# Patient Record
Sex: FEMALE | Race: ASIAN | NOT HISPANIC OR LATINO
[De-identification: names, ages, dates, MRNs, and addresses within clinical notes are randomized per-mention and may not be internally consistent; named-entity substitution may affect disease eponyms.]

---

## 2018-06-21 ENCOUNTER — APPOINTMENT (OUTPATIENT)
Dept: SURGERY | Facility: CLINIC | Age: 48
End: 2018-06-21
Payer: MEDICAID

## 2018-06-21 ENCOUNTER — APPOINTMENT (OUTPATIENT)
Dept: SURGERY | Facility: CLINIC | Age: 48
End: 2018-06-21

## 2018-06-21 VITALS
BODY MASS INDEX: 23.3 KG/M2 | DIASTOLIC BLOOD PRESSURE: 80 MMHG | HEART RATE: 71 BPM | SYSTOLIC BLOOD PRESSURE: 118 MMHG | WEIGHT: 145 LBS | TEMPERATURE: 98.2 F | HEIGHT: 66 IN

## 2018-06-21 DIAGNOSIS — Z78.9 OTHER SPECIFIED HEALTH STATUS: ICD-10-CM

## 2018-06-21 DIAGNOSIS — Z80.3 FAMILY HISTORY OF MALIGNANT NEOPLASM OF BREAST: ICD-10-CM

## 2018-06-21 DIAGNOSIS — R92.8 OTHER ABNORMAL AND INCONCLUSIVE FINDINGS ON DIAGNOSTIC IMAGING OF BREAST: ICD-10-CM

## 2018-06-21 PROCEDURE — 99203 OFFICE O/P NEW LOW 30 MIN: CPT

## 2019-04-07 ENCOUNTER — EMERGENCY (EMERGENCY)
Facility: HOSPITAL | Age: 49
LOS: 1 days | Discharge: HOME | End: 2019-04-07
Attending: EMERGENCY MEDICINE | Admitting: EMERGENCY MEDICINE
Payer: MEDICAID

## 2019-04-07 VITALS
RESPIRATION RATE: 15 BRPM | DIASTOLIC BLOOD PRESSURE: 87 MMHG | OXYGEN SATURATION: 99 % | SYSTOLIC BLOOD PRESSURE: 130 MMHG | TEMPERATURE: 98 F | HEART RATE: 91 BPM

## 2019-04-07 PROCEDURE — 73030 X-RAY EXAM OF SHOULDER: CPT | Mod: 26,RT

## 2019-04-07 PROCEDURE — 73070 X-RAY EXAM OF ELBOW: CPT | Mod: 26,RT

## 2019-04-07 PROCEDURE — 73060 X-RAY EXAM OF HUMERUS: CPT | Mod: 26,RT

## 2019-04-07 PROCEDURE — 99283 EMERGENCY DEPT VISIT LOW MDM: CPT | Mod: 25

## 2019-04-07 PROCEDURE — 73130 X-RAY EXAM OF HAND: CPT | Mod: 26,RT

## 2019-04-07 RX ORDER — ACETAMINOPHEN 500 MG
975 TABLET ORAL ONCE
Qty: 0 | Refills: 0 | Status: COMPLETED | OUTPATIENT
Start: 2019-04-07 | End: 2019-04-07

## 2019-04-07 RX ADMIN — Medication 975 MILLIGRAM(S): at 04:26

## 2019-04-07 NOTE — ED PROVIDER NOTE - CLINICAL SUMMARY MEDICAL DECISION MAKING FREE TEXT BOX
R arm pain s/p mechanical fall. Neurovascularly intact. No obvious deformity. Will assess for fracture/dislocation with x-rays. Tylenol and reassess.

## 2019-04-07 NOTE — ED PROVIDER NOTE - ATTENDING CONTRIBUTION TO CARE
MD Gallego:  I performed a face to face bedside interview with patient regarding history of present illness, review of symptoms and past medical history. I completed an independent physical exam(documented below).  I have discussed patient's plan of care with resident.   I agree with note as stated above, having amended the EMR as needed to reflect my findings. I have discussed the assessment and plan of care.  This includes during the time I functioned as the attending physician for this patient.  PE:  Gen: Alert, mild distress  Head: NC, AT,  EOMI, normal lids/conjunctiva  ENT:  normal hearing, patent oropharynx without erythema/exudate  Neck: +supple, no tenderness/meningismus/JVD, +Trachea midline  Chest: no chest wall tenderness, equal chest rise  Pulm: Bilateral BS, normal resp effort, no wheeze/stridor/retractions  CV: RRR, no M/R/G, +dist pulses  Abd: +BS, soft, NT/ND  Rectal: deferred  Mskel: ttp throughout most of RUE (greatest over R elbow and dorsal R hand), +edema of R hand  Skin: no rash  Neuro: AAOx3  MDM:  47yo F, denies pmh, c/o R arm pain s/p mechanical fall (tripped on dress). No head trauma, no loc, not on AC. Imaging, arm sling for comfort if no fractures, pain meds, likely dc.

## 2019-04-07 NOTE — ED PROVIDER NOTE - NSFOLLOWUPINSTRUCTIONS_ED_ALL_ED_FT
Follow up with your PCP in 24-48 hours.   May take Tylenol and Motrin as directed on the bottle for pain control.   Return to the ER if you develop any new or worsening symptoms such as chest pain, SOB, numbness, weakness, nausea, vomiting, or visual changes.

## 2019-04-07 NOTE — ED PROVIDER NOTE - OBJECTIVE STATEMENT
48F no PMHx p/w R arm pain after a mechanical fall. Accidentally tripped on her dress and fell onto her R arm just prior to coming to the ED. Denies head trauma, LOC, or blood thinner use. Able to ambulate without pain. Reporting pain to entire R arm below the shoulder. Associated with mild numbness over the lateral deltoid. Denies any other symptoms including HA, visual changes, chest pain, SOB, abdominal pain, N/V/D, weakness.

## 2019-04-07 NOTE — ED ADULT TRIAGE NOTE - CHIEF COMPLAINT QUOTE
pt s/p mechanical fall today 6pm and now c/o right sided body pain and inability to raise or move right arm. pt ambulatory in triage. no obvious deformities noted. denies loc or blood thinner use

## 2019-04-07 NOTE — ED ADULT NURSE NOTE - NSIMPLEMENTINTERV_GEN_ALL_ED
Implemented All Fall Risk Interventions:  [place] to call system. Call bell, personal items and telephone within reach. Instruct patient to call for assistance. Room bathroom lighting operational. Non-slip footwear when patient is off stretcher. Physically safe environment: no spills, clutter or unnecessary equipment. Stretcher in lowest position, wheels locked, appropriate side rails in place. Provide visual cue, wrist band, yellow gown, etc. Monitor gait and stability. Monitor for mental status changes and reorient to person, place, and time. Review medications for side effects contributing to fall risk. Reinforce activity limits and safety measures with patient and family.

## 2019-04-07 NOTE — ED ADULT NURSE NOTE - OBJECTIVE STATEMENT
Pt arrives to ED s/p trip and fall down 8 steps landing on her right arm and buttocks.  Pt denies LOC or striking her head.  UCG completed and documented.  Pt assessed by MD.  Pt awaiting XR.  Pt reports pain to rt shoulder, arm and hand.

## 2019-04-07 NOTE — ED PROVIDER NOTE - NS ED ROS FT
GENERAL: No fever or chills  EYES: no change in vision  HEENT: no trouble swallowing or speaking  CARDIAC: no chest pain  PULMONARY: no cough or SOB  GI: no abdominal pain, no nausea, no vomiting, no diarrhea or constipation  : No changes in urination  SKIN: no rashes/bruising/laceration   NEURO: numbness over R deltoid. no headache or weakness  MSK: R shoulder pan, R elbow pain, R hand pain    ~Rambo Spann PGY1

## 2020-01-04 ENCOUNTER — NON-APPOINTMENT (OUTPATIENT)
Age: 50
End: 2020-01-04

## 2020-01-04 ENCOUNTER — APPOINTMENT (OUTPATIENT)
Dept: OPHTHALMOLOGY | Facility: CLINIC | Age: 50
End: 2020-01-04
Payer: MEDICAID

## 2020-01-04 PROCEDURE — 92004 COMPRE OPH EXAM NEW PT 1/>: CPT

## 2020-01-04 PROCEDURE — 92015 DETERMINE REFRACTIVE STATE: CPT

## 2022-11-06 ENCOUNTER — EMERGENCY (EMERGENCY)
Facility: HOSPITAL | Age: 52
LOS: 1 days | Discharge: HOME | End: 2022-11-06
Attending: STUDENT IN AN ORGANIZED HEALTH CARE EDUCATION/TRAINING PROGRAM | Admitting: STUDENT IN AN ORGANIZED HEALTH CARE EDUCATION/TRAINING PROGRAM

## 2022-11-06 VITALS
SYSTOLIC BLOOD PRESSURE: 127 MMHG | OXYGEN SATURATION: 99 % | TEMPERATURE: 98 F | RESPIRATION RATE: 18 BRPM | DIASTOLIC BLOOD PRESSURE: 84 MMHG | HEART RATE: 72 BPM

## 2022-11-06 VITALS
SYSTOLIC BLOOD PRESSURE: 118 MMHG | TEMPERATURE: 98 F | DIASTOLIC BLOOD PRESSURE: 79 MMHG | HEART RATE: 75 BPM | RESPIRATION RATE: 16 BRPM | OXYGEN SATURATION: 100 %

## 2022-11-06 LAB
ALBUMIN SERPL ELPH-MCNC: 4.2 G/DL — SIGNIFICANT CHANGE UP (ref 3.3–5)
ALP SERPL-CCNC: 73 U/L — SIGNIFICANT CHANGE UP (ref 40–120)
ALT FLD-CCNC: 22 U/L — SIGNIFICANT CHANGE UP (ref 4–33)
ANION GAP SERPL CALC-SCNC: 8 MMOL/L — SIGNIFICANT CHANGE UP (ref 7–14)
APPEARANCE UR: ABNORMAL
AST SERPL-CCNC: 26 U/L — SIGNIFICANT CHANGE UP (ref 4–32)
BASE EXCESS BLDV CALC-SCNC: 0.4 MMOL/L — SIGNIFICANT CHANGE UP (ref -2–3)
BASOPHILS # BLD AUTO: 0.05 K/UL — SIGNIFICANT CHANGE UP (ref 0–0.2)
BASOPHILS NFR BLD AUTO: 1 % — SIGNIFICANT CHANGE UP (ref 0–2)
BILIRUB SERPL-MCNC: 0.6 MG/DL — SIGNIFICANT CHANGE UP (ref 0.2–1.2)
BILIRUB UR-MCNC: NEGATIVE — SIGNIFICANT CHANGE UP
BUN SERPL-MCNC: 10 MG/DL — SIGNIFICANT CHANGE UP (ref 7–23)
CA-I SERPL-SCNC: 1.27 MMOL/L — SIGNIFICANT CHANGE UP (ref 1.15–1.33)
CALCIUM SERPL-MCNC: 9.5 MG/DL — SIGNIFICANT CHANGE UP (ref 8.4–10.5)
CHLORIDE BLDV-SCNC: 104 MMOL/L — SIGNIFICANT CHANGE UP (ref 96–108)
CHLORIDE SERPL-SCNC: 106 MMOL/L — SIGNIFICANT CHANGE UP (ref 98–107)
CO2 BLDV-SCNC: 28 MMOL/L — HIGH (ref 22–26)
CO2 SERPL-SCNC: 26 MMOL/L — SIGNIFICANT CHANGE UP (ref 22–31)
COLOR SPEC: YELLOW — SIGNIFICANT CHANGE UP
CREAT SERPL-MCNC: 0.61 MG/DL — SIGNIFICANT CHANGE UP (ref 0.5–1.3)
DIFF PNL FLD: NEGATIVE — SIGNIFICANT CHANGE UP
EGFR: 110 ML/MIN/1.73M2 — SIGNIFICANT CHANGE UP
EOSINOPHIL # BLD AUTO: 0.05 K/UL — SIGNIFICANT CHANGE UP (ref 0–0.5)
EOSINOPHIL NFR BLD AUTO: 1 % — SIGNIFICANT CHANGE UP (ref 0–6)
FLUAV AG NPH QL: SIGNIFICANT CHANGE UP
FLUBV AG NPH QL: SIGNIFICANT CHANGE UP
GAS PNL BLDV: 137 MMOL/L — SIGNIFICANT CHANGE UP (ref 136–145)
GAS PNL BLDV: SIGNIFICANT CHANGE UP
GAS PNL BLDV: SIGNIFICANT CHANGE UP
GLUCOSE BLDV-MCNC: 84 MG/DL — SIGNIFICANT CHANGE UP (ref 70–99)
GLUCOSE SERPL-MCNC: 88 MG/DL — SIGNIFICANT CHANGE UP (ref 70–99)
GLUCOSE UR QL: NEGATIVE — SIGNIFICANT CHANGE UP
HCG UR QL: NEGATIVE — SIGNIFICANT CHANGE UP
HCO3 BLDV-SCNC: 26 MMOL/L — SIGNIFICANT CHANGE UP (ref 22–29)
HCT VFR BLD CALC: 36 % — SIGNIFICANT CHANGE UP (ref 34.5–45)
HCT VFR BLDA CALC: 35 % — SIGNIFICANT CHANGE UP (ref 34.5–46.5)
HGB BLD CALC-MCNC: 11.7 G/DL — SIGNIFICANT CHANGE UP (ref 11.5–15.5)
HGB BLD-MCNC: 11.3 G/DL — LOW (ref 11.5–15.5)
IANC: 2.7 K/UL — SIGNIFICANT CHANGE UP (ref 1.8–7.4)
IMM GRANULOCYTES NFR BLD AUTO: 0.2 % — SIGNIFICANT CHANGE UP (ref 0–0.9)
KETONES UR-MCNC: ABNORMAL
LACTATE BLDV-MCNC: 1.1 MMOL/L — SIGNIFICANT CHANGE UP (ref 0.5–2)
LEUKOCYTE ESTERASE UR-ACNC: NEGATIVE — SIGNIFICANT CHANGE UP
LIDOCAIN IGE QN: 50 U/L — SIGNIFICANT CHANGE UP (ref 7–60)
LYMPHOCYTES # BLD AUTO: 1.7 K/UL — SIGNIFICANT CHANGE UP (ref 1–3.3)
LYMPHOCYTES # BLD AUTO: 34.1 % — SIGNIFICANT CHANGE UP (ref 13–44)
MCHC RBC-ENTMCNC: 27.1 PG — SIGNIFICANT CHANGE UP (ref 27–34)
MCHC RBC-ENTMCNC: 31.4 GM/DL — LOW (ref 32–36)
MCV RBC AUTO: 86.3 FL — SIGNIFICANT CHANGE UP (ref 80–100)
MONOCYTES # BLD AUTO: 0.48 K/UL — SIGNIFICANT CHANGE UP (ref 0–0.9)
MONOCYTES NFR BLD AUTO: 9.6 % — SIGNIFICANT CHANGE UP (ref 2–14)
NEUTROPHILS # BLD AUTO: 2.7 K/UL — SIGNIFICANT CHANGE UP (ref 1.8–7.4)
NEUTROPHILS NFR BLD AUTO: 54.1 % — SIGNIFICANT CHANGE UP (ref 43–77)
NITRITE UR-MCNC: NEGATIVE — SIGNIFICANT CHANGE UP
NRBC # BLD: 0 /100 WBCS — SIGNIFICANT CHANGE UP (ref 0–0)
NRBC # FLD: 0 K/UL — SIGNIFICANT CHANGE UP (ref 0–0)
PCO2 BLDV: 48 MMHG — HIGH (ref 39–42)
PH BLDV: 7.35 — SIGNIFICANT CHANGE UP (ref 7.32–7.43)
PH UR: 6.5 — SIGNIFICANT CHANGE UP (ref 5–8)
PLATELET # BLD AUTO: 364 K/UL — SIGNIFICANT CHANGE UP (ref 150–400)
PO2 BLDV: 22 MMHG — SIGNIFICANT CHANGE UP
POTASSIUM BLDV-SCNC: 3.7 MMOL/L — SIGNIFICANT CHANGE UP (ref 3.5–5.1)
POTASSIUM SERPL-MCNC: 4 MMOL/L — SIGNIFICANT CHANGE UP (ref 3.5–5.3)
POTASSIUM SERPL-SCNC: 4 MMOL/L — SIGNIFICANT CHANGE UP (ref 3.5–5.3)
PROT SERPL-MCNC: 7.1 G/DL — SIGNIFICANT CHANGE UP (ref 6–8.3)
PROT UR-MCNC: ABNORMAL
RBC # BLD: 4.17 M/UL — SIGNIFICANT CHANGE UP (ref 3.8–5.2)
RBC # FLD: 13.6 % — SIGNIFICANT CHANGE UP (ref 10.3–14.5)
RBC CASTS # UR COMP ASSIST: SIGNIFICANT CHANGE UP /HPF (ref 0–4)
RSV RNA NPH QL NAA+NON-PROBE: SIGNIFICANT CHANGE UP
SAO2 % BLDV: 35.3 % — SIGNIFICANT CHANGE UP
SARS-COV-2 RNA SPEC QL NAA+PROBE: SIGNIFICANT CHANGE UP
SODIUM SERPL-SCNC: 140 MMOL/L — SIGNIFICANT CHANGE UP (ref 135–145)
SP GR SPEC: 1.02 — SIGNIFICANT CHANGE UP (ref 1.01–1.05)
UROBILINOGEN FLD QL: SIGNIFICANT CHANGE UP
WBC # BLD: 4.99 K/UL — SIGNIFICANT CHANGE UP (ref 3.8–10.5)
WBC # FLD AUTO: 4.99 K/UL — SIGNIFICANT CHANGE UP (ref 3.8–10.5)
WBC UR QL: SIGNIFICANT CHANGE UP /HPF (ref 0–5)

## 2022-11-06 PROCEDURE — 76830 TRANSVAGINAL US NON-OB: CPT | Mod: 26

## 2022-11-06 PROCEDURE — 73502 X-RAY EXAM HIP UNI 2-3 VIEWS: CPT | Mod: 26,LT

## 2022-11-06 PROCEDURE — 74177 CT ABD & PELVIS W/CONTRAST: CPT | Mod: 26,MA

## 2022-11-06 PROCEDURE — 76770 US EXAM ABDO BACK WALL COMP: CPT | Mod: 26

## 2022-11-06 PROCEDURE — 99285 EMERGENCY DEPT VISIT HI MDM: CPT

## 2022-11-06 RX ORDER — ACETAMINOPHEN 500 MG
975 TABLET ORAL ONCE
Refills: 0 | Status: COMPLETED | OUTPATIENT
Start: 2022-11-06 | End: 2022-11-06

## 2022-11-06 RX ORDER — SODIUM CHLORIDE 9 MG/ML
1000 INJECTION INTRAMUSCULAR; INTRAVENOUS; SUBCUTANEOUS ONCE
Refills: 0 | Status: COMPLETED | OUTPATIENT
Start: 2022-11-06 | End: 2022-11-06

## 2022-11-06 RX ORDER — IBUPROFEN 200 MG
400 TABLET ORAL ONCE
Refills: 0 | Status: COMPLETED | OUTPATIENT
Start: 2022-11-06 | End: 2023-10-05

## 2022-11-06 RX ORDER — CEFTRIAXONE 500 MG/1
500 INJECTION, POWDER, FOR SOLUTION INTRAMUSCULAR; INTRAVENOUS ONCE
Refills: 0 | Status: COMPLETED | OUTPATIENT
Start: 2022-11-06 | End: 2022-11-06

## 2022-11-06 RX ORDER — METRONIDAZOLE 500 MG
500 TABLET ORAL ONCE
Refills: 0 | Status: COMPLETED | OUTPATIENT
Start: 2022-11-06 | End: 2022-11-06

## 2022-11-06 RX ORDER — MORPHINE SULFATE 50 MG/1
4 CAPSULE, EXTENDED RELEASE ORAL ONCE
Refills: 0 | Status: DISCONTINUED | OUTPATIENT
Start: 2022-11-06 | End: 2022-11-06

## 2022-11-06 RX ORDER — LIDOCAINE 4 G/100G
1 CREAM TOPICAL
Qty: 5 | Refills: 0
Start: 2022-11-06 | End: 2022-11-10

## 2022-11-06 RX ORDER — METRONIDAZOLE 500 MG
1 TABLET ORAL
Qty: 28 | Refills: 0
Start: 2022-11-06 | End: 2022-11-19

## 2022-11-06 RX ORDER — ACETAMINOPHEN 500 MG
650 TABLET ORAL ONCE
Refills: 0 | Status: DISCONTINUED | OUTPATIENT
Start: 2022-11-06 | End: 2022-11-06

## 2022-11-06 RX ORDER — IBUPROFEN 200 MG
400 TABLET ORAL ONCE
Refills: 0 | Status: COMPLETED | OUTPATIENT
Start: 2022-11-06 | End: 2022-11-06

## 2022-11-06 RX ADMIN — Medication 400 MILLIGRAM(S): at 15:38

## 2022-11-06 RX ADMIN — Medication 500 MILLIGRAM(S): at 18:59

## 2022-11-06 RX ADMIN — Medication 975 MILLIGRAM(S): at 11:55

## 2022-11-06 RX ADMIN — Medication 975 MILLIGRAM(S): at 15:44

## 2022-11-06 RX ADMIN — CEFTRIAXONE 500 MILLIGRAM(S): 500 INJECTION, POWDER, FOR SOLUTION INTRAMUSCULAR; INTRAVENOUS at 18:59

## 2022-11-06 RX ADMIN — Medication 100 MILLIGRAM(S): at 18:58

## 2022-11-06 RX ADMIN — SODIUM CHLORIDE 1000 MILLILITER(S): 9 INJECTION INTRAMUSCULAR; INTRAVENOUS; SUBCUTANEOUS at 14:52

## 2022-11-06 NOTE — CONSULT NOTE ADULT - ASSESSMENT
51y  LMP  w/ 1 month of back pain which radiates to suprapubic area and down legs with positional changes.  GYN consulted for rule out torsion.  Review of imaging demonstrates no sonographic signs of torsion.  Right hydrosalpinx noted.  No signs or symptoms of systemic infection.  Labs and vital signs wnl.  Differential includes PID however could also be an incidental finding.  Does not meet criteria for inpatient treatment.    Recs:  - Ceftriaxone IM x1, followed by Doxycylcine 100mg BID and Metronidazole 500mg BID PO x14d  - Urine GC/C to be sent by ED  - Close follow up with outpatient GYN for surveillance of sonographic findings  - Remainder of workup, treatment and dispo per ED  - Please reconsult with any further questions    Pt seen and examined w/ Dr. Luis Felipe Bates, PGY4 51y  LMP  w/ 1 month of back pain which radiates to suprapubic area and down legs with positional changes.  GYN consulted for rule out torsion.  Review of imaging demonstrates no sonographic signs of torsion.  Right hydrosalpinx noted.  No signs or symptoms of systemic infection.  Physical exam with non specific findings.  Labs and vital signs wnl.  Differential includes PID however could also be an incidental finding.  Does not meet criteria for inpatient treatment.    Recs:  - Ceftriaxone IM x1, followed by Doxycylcine 100mg BID and Metronidazole 500mg BID PO x14d  - Urine GC/C to be sent by ED  - Close follow up with outpatient GYN for surveillance of sonographic findings  - Remainder of workup, treatment and dispo per ED  - Please reconsult with any further questions    Pt seen and examined w/ Dr. Luis Felipe Bates, PGY4

## 2022-11-06 NOTE — ED PROVIDER NOTE - OBJECTIVE STATEMENT
51-year-old female pmhx of recent diagnosis of UTI finishing 7-day course of nitrofurantoin and 1 day comes to ED w/ left-sided low back/flank pain radiating to the left lower quadrant. Their pain/symptom is moderate, constant, non mediating with rest, associated with nausea. Started 1 month prior.  Patient reports missing there last menstrual cycle on 9/26/2022 taking a Plan B due to concerns for pregnancy and subsequently missing their next cycle on 10/26/2020. Reports symptoms of nausea, denies vomiting, diarrhea, falls, trauma, chest pain, shortness of breath, cough, urinary symptoms, stool symptoms. Has only one sexual partner which is her , and reports relationship is monogamous.

## 2022-11-06 NOTE — ED PROVIDER NOTE - ATTENDING CONTRIBUTION TO CARE
I have personally seen and examined this patient.  I have fully participated in the care of this patient. I performed a substantive portion of the visit including all aspects of the medical decision making. I have reviewed all pertinent clinical information, including history, physical exam, plan and the Resident’s note and agree except as noted. - MD Suzanne.    52 yo F, with RLQ to LLQ pain, more tender to RLQ and suprapuvic, dxx broad including appendicitis, diverticulitis, vs. reproductive organ infection including pid and toa, but given no systemic signs of infection, no urgency but needs eval given the level of her discomfort, trans vaginal us, ct, pain meds, and likely gyn consult, according to ct/us findings.

## 2022-11-06 NOTE — CONSULT NOTE ADULT - SUBJECTIVE AND OBJECTIVE BOX
GYN Consult Note    51y  LMP  (Plan B taken in October for unprotected intercourse) presents with 1 month of back pain.  Patient states back pain started 1 month ago around her left flank, and has been constant since.  Pain radiates down her legs and to her suprapubic region at times.  No relieving factors at home.  Intermittently taking tylenol with some relief.  Went to an Urgent Care/PCP last week and was prescribed a 1 week course of nitrofurantoin for suspected UTI as well as gabapentin/meloxicam for symptomatic relief.  She has taken the antibiotics but not yet tried the gabapentin/meloxicam.  Denies nausea, vomiting, changes in urinary symptoms/bowel function, fevers, chills, night sweats.      OB/GYN HISTORY:    x3 (, , ); eTOP D&C x2  States normal pap smears  Name of GYN Physician: Dr. Ita Arceo    PMH: denies  PSH: R rotator cuff surgery, D&C x2  Meds: Macrobid x5d  Allergies: NKDA    REVIEW OF SYSTEMS  General: denies fevers, chills, tiredness  Skin/Breast: denies breast pain  Respiratory and Thorax: denies shortness of breath, denies cough  Cardiovascular: denies chest pain and denies palpitations  Gastrointestinal: denies abdominal pain, nausea/ vomiting	  Genitourinary: denies dysuria, increased urinary frequency, urgency	  Constitutional, Cardiovascular, Respiratory, Gastrointestinal, Genitourinary, Musculoskeletal and Integumentary review of systems are normal except as noted. 	        Vital Signs Last 24 Hrs  T(C): 36.1 (2022 13:43), Max: 36.7 (2022 10:34)  T(F): 97 (2022 13:43), Max: 98 (2022 10:34)  HR: 71 (2022 13:43) (71 - 72)  BP: 124/85 (2022 13:43) (124/85 - 127/84)  BP(mean): --  RR: 16 (2022 13:43) (16 - 18)  SpO2: 100% (2022 13:43) (99% - 100%)    Parameters below as of 2022 13:43  Patient On (Oxygen Delivery Method): room air        PHYSICAL EXAM:   Gen: NAD, alert and oriented x 3  Cardiovascular: regular   Respiratory: breathing comfortably on RA  Abd: soft, non tender in 4 quadrants, suprapubic tenderness to deep palpation, non-distended  Pelvic: closed/long, no CMT, Uterus: normal size, non tender  Adnexa: non tender, no palpable masses  Extremities: NTBL  Skin: warm and well perfused      LABS:                        11.3   4.99  )-----------( 364      ( 2022 13:36 )             36.0     11    140  |  106  |  10  ----------------------------<  88  4.0   |  26  |  0.61    Ca    9.5      2022 13:36    TPro  7.1  /  Alb  4.2  /  TBili  0.6  /  DBili  x   /  AST  26  /  ALT  22  /  AlkPhos  73  11-06      Urinalysis Basic - ( 2022 12:10 )    Color: Yellow / Appearance: Slightly Turbid / S.025 / pH: x  Gluc: x / Ketone: Small  / Bili: Negative / Urobili: <2 mg/dL   Blood: x / Protein: 30 mg/dL / Nitrite: Negative   Leuk Esterase: Negative / RBC: NA /HPF / WBC NA /HPF   Sq Epi: x / Non Sq Epi: x / Bacteria: x    HCG Quantitative, Serum (22 @ 13:36)   HCG Quantitative, Serum: <5.0    RADIOLOGY & ADDITIONAL STUDIES:  < from: US Kidney and Bladder (22 @ 13:01) >    ACC: [id number] EXAM:  US KIDNEYS AND BLADDER                          PROCEDURE DATE:  2022          INTERPRETATION:  CLINICAL INFORMATION: Pelvic and back pain.    COMPARISON: CT abdomen/pelvis 2016.    TECHNIQUE: Sonography of the kidneys and bladder.    FINDINGS:  Right kidney: 9.6 cm. No renal mass or hydronephrosis.    Left kidney: 9.6 cm. No renal mass or hydronephrosis.    Urinary bladder: Within normal limits.    IMPRESSION:  Normal renal ultrasound.        --- End of Report ---      < end of copied text >      < from: US Transvaginal (22 @ 13:01) >    ACC: [id number] EXAM:  US TRANSVAGINAL                          PROCEDURE DATE:  2022          INTERPRETATION:  CLINICAL INFORMATION: Left lower quadrant pain. Request   to evaluate for ovarian torsion.    LMP: 2022.    COMPARISON: CT abdomen/pelvis 2016.    TECHNIQUE:  Endovaginal and transabdominal pelvic sonogram. Color and Spectral   Doppler was performed.    FINDINGS:  Uterus: 10.6 cm x 5.8 cm x 7.6 cm. Enlarged  Endometrium: 12 mm. Within normal limits.    Right ovary: 1.6 cm x 1.1 cm x 2.1 cm. Within normal limits. Normal   arterial waveforms. Right hydrosalpinx.  Left ovary: 2.2 cm x 1.7 cm x 1.7 cm. Within normal limits. Normal   arterial waveforms.    Fluid: None.    IMPRESSION:    No sonographic evidence of ovarian torsion. Right sided hydrosalpinx.        --- End of Report ---    < end of copied text >      < from: CT Abdomen and Pelvis w/ IV Cont (22 @ 14:32) >    ACC: [id number] EXAM:  CT ABDOMEN AND PELVIS IC                          PROCEDURE DATE:  2022          INTERPRETATION:  CLINICAL INFORMATION: Left-sided back/flank pain. Recent   history of treated UTI.    COMPARISON: CT abdomen/pelvis 2016. Abdominal and pelvic ultrasound   2022.    CONTRAST/COMPLICATIONS:  IV Contrast: Omnipaque 350  90 cc administered   10 cc discarded  Oral Contrast: NONE  Complications: None reported at time of study completion    PROCEDURE:  CT of the Abdomen and Pelvis was performed.  Sagittal and coronal reformats were performed.    FINDINGS:  LOWER CHEST: Bibasilar atelectasis.    LIVER: Within normal limits.  BILE DUCTS: Normal caliber.  GALLBLADDER: Within normal limits.  SPLEEN: Within normal limits.  PANCREAS: Within normal limits.  ADRENALS: Within normal limits.  KIDNEYS/URETERS: Within normal limits.    BLADDER: Within normal limits.  REPRODUCTIVE ORGANS: Multiple nodular foci in the myometrium compatible   with fibroids. A 5.6 x 3.2 cm cystic structure in the right adnexa. No   surrounding inflammatory changes.    BOWEL: No bowel obstruction. Appendix is normal.  PERITONEUM: No ascites.  VESSELS: Within normal limits.  RETROPERITONEUM/LYMPH NODES: No lymphadenopathy.  ABDOMINAL WALL: Tiny fat-containing umbilical hernia.  BONES: Degenerative changes.    IMPRESSION:    5.6 cm cystic right adnexal mass. This could represent an ovarian or   paraovarian cyst, hydrosalpinx or other etiology such as peritoneal   inclusion cyst. Note that this appeared to be separate from the right   ovary on same day ultrasound. Recommend contrast-enhanced MRI to better   evaluate.    Fibroid uterus.    --- End of Report ---        < end of copied text >

## 2022-11-06 NOTE — ED ADULT NURSE REASSESSMENT NOTE - ANCILLARY STATUS
radiology results pending
labs sent, sl placed/awaiting lab draw/awaiting radiology
radiology results pending

## 2022-11-06 NOTE — ED PROVIDER NOTE - PHYSICAL EXAMINATION
General: non-toxic, NAD  HEENT: NCAT, PERRL  Cardiac: RRR, no murmurs, 2+ peripheral pulses  Chest: CTAB  Abdomen: mild LLQ/ L CVA ttp, no rebound or guarding. soft, non-distended, bowel sounds present.  MSK: L lower back paraspinal wrapping down superior gluteus to front of abdomen TTP   Extremities: no peripheral edema, calf tenderness, or leg size discrepancies  Skin: no rashes  Neuro: AAOx4, 5+motor, sensory grossly intact  Psych: mood and affect appropriate

## 2022-11-06 NOTE — ED ADULT NURSE REASSESSMENT NOTE - GENERAL PATIENT STATE
comfortable appearance
comfortable appearance
comfortable appearance/resting/sleeping
family/SO at bedside/no change observed

## 2022-11-06 NOTE — ED PROVIDER NOTE - PROGRESS NOTE DETAILS
Patient's prescription sent to their pharmacy indicated during interview. Patient reports improvement on symptoms. Spoke to patient about results. Plan to discharge patient. Patient given OBGYN and PCP follow up and return precautions. Patient agrees with plan. OBGYN gave recs, plan to give antibiotics for prophylactic PID treatment and follow up with OBGYN outpatient. Patient given abx, Patient's prescription sent to their pharmacy indicated during interview. Patient reports improvement on symptoms. Spoke to patient about results. Plan to discharge patient. Patient given OBGYN and PCP follow up and return precautions. Patient agrees with plan.

## 2022-11-06 NOTE — ED ADULT NURSE REASSESSMENT NOTE - SYMPTOMS
back pain radiating down lt leg/abdominal pain back pain radiating down lt leg, abd soft on palpation/abdominal pain

## 2022-11-06 NOTE — ED ADULT TRIAGE NOTE - CHIEF COMPLAINT QUOTE
Pt AOX4 c/o low back pain, is affecting her ADLs as she cannot sit or dress herself easily; has seen her PMD and was Dx'd w/ UTI (she is finishing abx today)   LMP 9/26/22

## 2022-11-06 NOTE — ED PROVIDER NOTE - PATIENT PORTAL LINK FT
You can access the FollowMyHealth Patient Portal offered by  by registering at the following website: [email or web address]. By joining [employer]’s FollowMyHealth portal, you will also be able to view your health information using other applications (apps) compatible with our system.

## 2022-11-06 NOTE — ED PROVIDER NOTE - NS ED ROS FT
Constitutional: no fevers, chills  HEENT: no cough, rhinorrhea  Cardiac: no chest pain, palpitations  Respiratory: no SOB  GI: nausea, no v/d, abd pain, bloody or dark stools  : no dysuria, frequency, or hematuria  MSK: Low left-sided back/left flank pain.  Skin: no rashes  Neuro: no headache, change in vision, weakness  Psych: negative

## 2022-11-06 NOTE — ED ADULT NURSE REASSESSMENT NOTE - COMFORT CARE
plan of care explained
heat pac given for comfort
plan of care explained
sl placed/plan of care explained

## 2022-11-06 NOTE — ED PROVIDER NOTE - NSFOLLOWUPINSTRUCTIONS_ED_ALL_ED_FT
You were seen in the Emergency Department for lower back pain. You were found to have a possible hydrosalpinx or ovarian/paraovarian cyst. You were evaluated by our OBGYN team and deemed safe for discharge.      1) Advance activity as tolerated.   2) New antibiotic prescription sent to pharmacy indicated in interview and take prescription as prescribed. Take up to 1000mg of tylenol every 8 hours, 400mg of ibuprofen every 8 hours, 1 lidocaine patch a day for your back pain symptoms. Take 10mg of cyclobenzaprine once a day as well if back pain symptoms not controlled with other back pain medication be careful however since this medication can make you drowsy. Continue all previously prescribed medications as directed.    3) Follow up with your OBGYN and your primary care physician in 1-3 days - take copies of your results.    4) Return to the Emergency Department for worsening or persistent symptoms, and/or ANY NEW OR CONCERNING SYMPTOMS.

## 2022-11-06 NOTE — ED PROVIDER NOTE - CLINICAL SUMMARY MEDICAL DECISION MAKING FREE TEXT BOX
Impression: 51-year-old female pmhx of recent diagnosis of UTI finishing 7-day course of nitrofurantoin and 1 day comes to ED w/ left-sided low back/flank pain radiating to the left lower quadrant. Their symptoms of left-sided low back/flank pain radiating to the left lower quadrant, exam findings of mild LLQ/ L CVA ttp, : L lower back paraspinal wrapping down superior gluteus to front of abdomen TTP  are concerning for ongoing UTI, kidney stone, muscle strain, pregnancy. Plan to eval for fracture.    Ordered urine labs, imaging, medications for diagnosis, management, and treatment.

## 2022-11-06 NOTE — ED PROVIDER NOTE - NSFOLLOWUPCLINICS_GEN_ALL_ED_FT
[facility]  OB/GYN & Surg  [address] [place] [id number]  Phone: [phone]  Fax:     [facility]st[facility]  Gynceology/OB  [address] [place] [address]  Phone: [phone]  Fax:     [facility]  OB-GYN  [address] [place] [address]  Phone: [phone]  Fax:

## 2022-11-07 LAB
C TRACH RRNA SPEC QL NAA+PROBE: SIGNIFICANT CHANGE UP
CULTURE RESULTS: SIGNIFICANT CHANGE UP
N GONORRHOEA RRNA SPEC QL NAA+PROBE: SIGNIFICANT CHANGE UP
SPECIMEN SOURCE: SIGNIFICANT CHANGE UP

## 2022-11-22 ENCOUNTER — EMERGENCY (EMERGENCY)
Facility: HOSPITAL | Age: 52
LOS: 1 days | Discharge: HOME | End: 2022-11-22
Attending: EMERGENCY MEDICINE | Admitting: EMERGENCY MEDICINE
Payer: MEDICAID

## 2022-11-22 VITALS
SYSTOLIC BLOOD PRESSURE: 130 MMHG | HEART RATE: 84 BPM | RESPIRATION RATE: 16 BRPM | TEMPERATURE: 99 F | OXYGEN SATURATION: 100 % | DIASTOLIC BLOOD PRESSURE: 76 MMHG

## 2022-11-22 PROCEDURE — 99203 OFFICE O/P NEW LOW 30 MIN: CPT

## 2022-11-22 PROCEDURE — 99243 OFF/OP CNSLTJ NEW/EST LOW 30: CPT

## 2022-11-22 PROCEDURE — 99285 EMERGENCY DEPT VISIT HI MDM: CPT

## 2022-11-22 NOTE — ED ADULT TRIAGE NOTE - CHIEF COMPLAINT QUOTE
c/o lower abdominal pain radiating to back x 5 weeks. reports was on antibiotics for 2 weeks for fluid in fallopian tube. finished Sunday. now also having fever and chills, also vaginal itching and burning.

## 2022-11-23 VITALS
SYSTOLIC BLOOD PRESSURE: 119 MMHG | OXYGEN SATURATION: 100 % | DIASTOLIC BLOOD PRESSURE: 78 MMHG | RESPIRATION RATE: 16 BRPM | HEART RATE: 90 BPM

## 2022-11-23 LAB
ALBUMIN SERPL ELPH-MCNC: 4.2 G/DL — SIGNIFICANT CHANGE UP (ref 3.3–5)
ALP SERPL-CCNC: 59 U/L — SIGNIFICANT CHANGE UP (ref 40–120)
ALT FLD-CCNC: 14 U/L — SIGNIFICANT CHANGE UP (ref 4–33)
ANION GAP SERPL CALC-SCNC: 11 MMOL/L — SIGNIFICANT CHANGE UP (ref 7–14)
APPEARANCE UR: CLEAR — SIGNIFICANT CHANGE UP
AST SERPL-CCNC: 19 U/L — SIGNIFICANT CHANGE UP (ref 4–32)
BACTERIA # UR AUTO: NEGATIVE — SIGNIFICANT CHANGE UP
BASE EXCESS BLDV CALC-SCNC: -2.3 MMOL/L — LOW (ref -2–3)
BASOPHILS # BLD AUTO: 0.08 K/UL — SIGNIFICANT CHANGE UP (ref 0–0.2)
BASOPHILS NFR BLD AUTO: 1 % — SIGNIFICANT CHANGE UP (ref 0–2)
BILIRUB SERPL-MCNC: <0.2 MG/DL — SIGNIFICANT CHANGE UP (ref 0.2–1.2)
BILIRUB UR-MCNC: NEGATIVE — SIGNIFICANT CHANGE UP
BLOOD GAS VENOUS COMPREHENSIVE RESULT: SIGNIFICANT CHANGE UP
BUN SERPL-MCNC: 9 MG/DL — SIGNIFICANT CHANGE UP (ref 7–23)
CALCIUM SERPL-MCNC: 9 MG/DL — SIGNIFICANT CHANGE UP (ref 8.4–10.5)
CHLORIDE BLDV-SCNC: 104 MMOL/L — SIGNIFICANT CHANGE UP (ref 96–108)
CHLORIDE SERPL-SCNC: 105 MMOL/L — SIGNIFICANT CHANGE UP (ref 98–107)
CO2 BLDV-SCNC: 25.1 MMOL/L — SIGNIFICANT CHANGE UP (ref 22–26)
CO2 SERPL-SCNC: 21 MMOL/L — LOW (ref 22–31)
COLOR SPEC: YELLOW — SIGNIFICANT CHANGE UP
CREAT SERPL-MCNC: 0.64 MG/DL — SIGNIFICANT CHANGE UP (ref 0.5–1.3)
DIFF PNL FLD: NEGATIVE — SIGNIFICANT CHANGE UP
EGFR: 108 ML/MIN/1.73M2 — SIGNIFICANT CHANGE UP
EOSINOPHIL # BLD AUTO: 0.11 K/UL — SIGNIFICANT CHANGE UP (ref 0–0.5)
EOSINOPHIL NFR BLD AUTO: 1.4 % — SIGNIFICANT CHANGE UP (ref 0–6)
GAS PNL BLDV: 135 MMOL/L — LOW (ref 136–145)
GLUCOSE BLDV-MCNC: 93 MG/DL — SIGNIFICANT CHANGE UP (ref 70–99)
GLUCOSE SERPL-MCNC: 92 MG/DL — SIGNIFICANT CHANGE UP (ref 70–99)
GLUCOSE UR QL: NEGATIVE — SIGNIFICANT CHANGE UP
HCG SERPL-ACNC: <5 MIU/ML — SIGNIFICANT CHANGE UP
HCO3 BLDV-SCNC: 24 MMOL/L — SIGNIFICANT CHANGE UP (ref 22–29)
HCT VFR BLD CALC: 32.6 % — LOW (ref 34.5–45)
HCT VFR BLDA CALC: 33 % — LOW (ref 34.5–46.5)
HGB BLD CALC-MCNC: 11.1 G/DL — LOW (ref 11.5–15.5)
HGB BLD-MCNC: 10.4 G/DL — LOW (ref 11.5–15.5)
IANC: 4 K/UL — SIGNIFICANT CHANGE UP (ref 1.8–7.4)
IMM GRANULOCYTES NFR BLD AUTO: 0.2 % — SIGNIFICANT CHANGE UP (ref 0–0.9)
KETONES UR-MCNC: NEGATIVE — SIGNIFICANT CHANGE UP
LACTATE BLDV-MCNC: 0.8 MMOL/L — SIGNIFICANT CHANGE UP (ref 0.5–2)
LEUKOCYTE ESTERASE UR-ACNC: NEGATIVE — SIGNIFICANT CHANGE UP
LIDOCAIN IGE QN: 60 U/L — SIGNIFICANT CHANGE UP (ref 7–60)
LYMPHOCYTES # BLD AUTO: 3.11 K/UL — SIGNIFICANT CHANGE UP (ref 1–3.3)
LYMPHOCYTES # BLD AUTO: 38.3 % — SIGNIFICANT CHANGE UP (ref 13–44)
MCHC RBC-ENTMCNC: 27.4 PG — SIGNIFICANT CHANGE UP (ref 27–34)
MCHC RBC-ENTMCNC: 31.9 GM/DL — LOW (ref 32–36)
MCV RBC AUTO: 85.8 FL — SIGNIFICANT CHANGE UP (ref 80–100)
MONOCYTES # BLD AUTO: 0.8 K/UL — SIGNIFICANT CHANGE UP (ref 0–0.9)
MONOCYTES NFR BLD AUTO: 9.9 % — SIGNIFICANT CHANGE UP (ref 2–14)
NEUTROPHILS # BLD AUTO: 4 K/UL — SIGNIFICANT CHANGE UP (ref 1.8–7.4)
NEUTROPHILS NFR BLD AUTO: 49.2 % — SIGNIFICANT CHANGE UP (ref 43–77)
NITRITE UR-MCNC: NEGATIVE — SIGNIFICANT CHANGE UP
NRBC # BLD: 0 /100 WBCS — SIGNIFICANT CHANGE UP (ref 0–0)
NRBC # FLD: 0 K/UL — SIGNIFICANT CHANGE UP (ref 0–0)
PCO2 BLDV: 45 MMHG — HIGH (ref 39–42)
PH BLDV: 7.33 — SIGNIFICANT CHANGE UP (ref 7.32–7.43)
PH UR: 6 — SIGNIFICANT CHANGE UP (ref 5–8)
PLATELET # BLD AUTO: 342 K/UL — SIGNIFICANT CHANGE UP (ref 150–400)
PO2 BLDV: 27 MMHG — SIGNIFICANT CHANGE UP
POTASSIUM BLDV-SCNC: 3.9 MMOL/L — SIGNIFICANT CHANGE UP (ref 3.5–5.1)
POTASSIUM SERPL-MCNC: 4 MMOL/L — SIGNIFICANT CHANGE UP (ref 3.5–5.3)
POTASSIUM SERPL-SCNC: 4 MMOL/L — SIGNIFICANT CHANGE UP (ref 3.5–5.3)
PROT SERPL-MCNC: 7.5 G/DL — SIGNIFICANT CHANGE UP (ref 6–8.3)
PROT UR-MCNC: NEGATIVE — SIGNIFICANT CHANGE UP
RBC # BLD: 3.8 M/UL — SIGNIFICANT CHANGE UP (ref 3.8–5.2)
RBC # FLD: 14.8 % — HIGH (ref 10.3–14.5)
RBC CASTS # UR COMP ASSIST: SIGNIFICANT CHANGE UP /HPF (ref 0–4)
SAO2 % BLDV: 35.3 % — SIGNIFICANT CHANGE UP
SODIUM SERPL-SCNC: 137 MMOL/L — SIGNIFICANT CHANGE UP (ref 135–145)
SP GR SPEC: 1.02 — SIGNIFICANT CHANGE UP (ref 1.01–1.05)
UROBILINOGEN FLD QL: SIGNIFICANT CHANGE UP
WBC # BLD: 8.12 K/UL — SIGNIFICANT CHANGE UP (ref 3.8–10.5)
WBC # FLD AUTO: 8.12 K/UL — SIGNIFICANT CHANGE UP (ref 3.8–10.5)
WBC UR QL: SIGNIFICANT CHANGE UP /HPF (ref 0–5)

## 2022-11-23 PROCEDURE — 76830 TRANSVAGINAL US NON-OB: CPT | Mod: 26

## 2022-11-23 RX ORDER — KETOROLAC TROMETHAMINE 30 MG/ML
15 SYRINGE (ML) INJECTION ONCE
Refills: 0 | Status: DISCONTINUED | OUTPATIENT
Start: 2022-11-23 | End: 2022-11-23

## 2022-11-23 RX ORDER — ACETAMINOPHEN 500 MG
975 TABLET ORAL ONCE
Refills: 0 | Status: COMPLETED | OUTPATIENT
Start: 2022-11-23 | End: 2022-11-23

## 2022-11-23 RX ADMIN — Medication 975 MILLIGRAM(S): at 00:41

## 2022-11-23 RX ADMIN — Medication 15 MILLIGRAM(S): at 00:41

## 2022-11-23 NOTE — ED PROVIDER NOTE - ATTENDING CONTRIBUTION TO CARE
51-year-old female with no significant past medical history here with right-sided abdominal pain.  Patient was seen in ED on 11/6 for the similar pain–had CT and ultrasound imaging at that time revealing right hydrosalpinx.  Patient was seen by GYN and discharged with 2-week course of antibiotics to cover PID.  Patient reports she finished her antibiotic course and continue to have pain.  She had followed up with her outpatient GYN and has an appointment scheduled for 12/19.  Patient denies associated fevers, nausea, vomiting, back pain, diarrhea, urinary symptoms.  Well appearing, lying comfortably in stretcher, awake and alert, nontoxic.  VSS.  Lungs cta bl.  Cards nl S1/S2, RRR, no MRG.  Abd soft ntnd no rebound or guarding.  No pedal edema or calf tenderness.  Abdominal exam is benign, likely chronic pelvic pain secondary to known hydrosalpinx.  Will obtain ultrasound to eval for size, rule out ovarian torsion versus TOA.  Plan for labs, urine, TVUS, pain control, and reassess.

## 2022-11-23 NOTE — ED PROVIDER NOTE - PROGRESS NOTE DETAILS
R ovary not well visualized on US. ob consulted Kyle Cortez, PGY4: Patient signed out to me. Here with lower quadrant pain s/p PID treatment. No acute findings on CT. Patient seen by GYN and cleared for outpatient follow up. Patient has GYN that she saw this month already. Discussed return precautions and all questions answered. Pt in agreement w/ plan. CAOx3, NAD, VSS. Stable for d/c.

## 2022-11-23 NOTE — CONSULT NOTE ADULT - ASSESSMENT
**INCOMPLETE**    YENY Scott  PGY-2 47yo P3 LMP 9/26 coming in with persistent RLQ that radiates down her leg and to her back after finishing a course of antibiotics for presumed PID on 11/6. Patient has a non-surgical abdomen and is hemodynamically stable.    # RLQ pain  - Unchanged 4.9cm abdominal cyst possibly paraovarian vs peritoneal vs hydrosalpinx. Low concern for current torsion given patient's benign abdomen.  - Patient is s/p treatment for presumed PID and is afebrile without leukocytosis. No CMT  - c/w Tylenol, Motrin, heat packs for symptomatic relief.  - No acute GYN intervention indicated. Patient recommended to follow up with outpatient GYN Dr. Arceo  - Return precautions reviewed including sudden severe abdominal pain, N/V, fever  - Rest of care and work up per ED    d/w Dr. Mariana Scott  PGY-2 49yo P3 LMP 9/26 coming in with persistent RLQ that radiates down her leg and to her back after finishing a course of antibiotics for presumed PID on 11/6. Patient has a non-surgical abdomen and is hemodynamically stable.    # RLQ pain  - Unchanged 4.9cm abdominal cyst possibly paraovarian vs peritoneal vs hydrosalpinx. Low concern for current torsion given patient's benign abdomen.  - Patient is s/p treatment for presumed PID and is afebrile without leukocytosis. No CMT  - c/w Tylenol, Motrin, heat packs for symptomatic relief.  - No acute GYN intervention indicated. Patient recommended to follow up with outpatient GYN Dr. Arceo. If further imaging such as MRI indicated, may be performed outpatient.  - Return precautions reviewed including sudden severe abdominal pain not improved with Tylenol or Motrin, N/V, fever  - Rest of care and work up per ED.     d/w Dr. Mariana Scott  PGY-2

## 2022-11-23 NOTE — ED PROVIDER NOTE - OBJECTIVE STATEMENT
Patient is a 51-year-old female presenting with abdominal pain.  Patient was seen here on November 6 for similar pain, at that time had a CT abdomen and transvaginal ultrasound which showed right hydrosalpinx.  OB/GYN was consulted at that time and discharge the patient with treatment for pelvic inflammatory disease.  Patient finished the antibiotics 2 days ago.  While she was on antibiotics she was feeling slightly better but still had some pain.  When she finished the antibiotics the pain started coming back worse.  She describes the pain as right lower quadrant radiating to her back and to the left, sharp, severe, worse with certain movements.  She also endorses mild dysuria and nausea, denies vomiting or fevers.  No history of abdominal surgery.  Denies chest pain, shortness of breath.

## 2022-11-23 NOTE — ED PROVIDER NOTE - CLINICAL SUMMARY MEDICAL DECISION MAKING FREE TEXT BOX
Scott - Patient is a 51-year-old female presenting with abdominal pain. ddx: ovarian torsion vs PID vs UTI. will check labs, urine, transvag US, give pain meds

## 2022-11-23 NOTE — ED ADULT NURSE REASSESSMENT NOTE - NS ED NURSE REASSESS COMMENT FT1
Pt appears to be resting comfortably, NAD, no complaints at this moment, respirations are even and unlabored, VS noted, Safety precautions implemented as per protocol, awaiting further MD orders, will continue to monitor.

## 2022-11-23 NOTE — ED PROVIDER NOTE - PHYSICAL EXAMINATION
Lisette Chavez MD  GENERAL: Patient awake alert NAD.  HEENT: NC/AT, Moist mucous membranes, PERRL, EOMI.  LUNGS: CTAB, no wheezes or crackles.   CARDIAC: RRR, no m/r/g.    ABDOMEN: Soft, +tender RLQ, ND, No rebound, guarding. +R CVA tenderness.   EXT: No edema. No calf tenderness. CV 2+DP/PT bilaterally.   MSK: No spinal tenderness, no pain with movement, no deformities.  NEURO: A&Ox3. Moving all extremities.  SKIN: Warm and dry. No rash.  PSYCH: Normal affect. Lisette Chavez MD  GENERAL: Patient awake alert NAD.  HEENT: NC/AT, Moist mucous membranes, PERRL, EOMI.  LUNGS: CTAB, no wheezes or crackles.   CARDIAC: RRR, no m/r/g.    ABDOMEN: Soft, +tender RLQ, ND, No rebound, guarding. +R CVA tenderness.   : no cervical tenderness, +mild R adnexal tenderness  EXT: No edema. No calf tenderness. CV 2+DP/PT bilaterally.   MSK: No spinal tenderness, no pain with movement, no deformities.  NEURO: A&Ox3. Moving all extremities.  SKIN: Warm and dry. No rash.  PSYCH: Normal affect.

## 2022-11-23 NOTE — ED ADULT NURSE NOTE - OBJECTIVE STATEMENT
Lilia RN: pt presents to ED A&04 ambualtory at baseline coming in complaining of bilateral lower abdominal pain. Patient was recently seen here for similar issue, CT showed at that time right hydrosalpinx. Patient was on a course of antibiotics finished 2 days ago and presents today for the same problem. Patient also endorses dysuria. Respirations even and unlabored. Lung sounds clear with equal chest rise bilaterally. ABD is soft, non tender, non distended with normal active bowel sounds No complaints of chest pain, headache, nausea, dizziness, vomiting  SOB, fever, chills, hematuria verbalized. 20GLAC, labs sent medicated as ordered. report given to primary RN Selene

## 2022-11-23 NOTE — ED ADULT NURSE NOTE - COVID-19 ORDERING FACILITY
Patient scheduled a wellness visit online with Dr Lucio on 9/30 at 10 am    Should have been scheduled as a mwv but was scheduled as a complete physical    Changed appointment to a MWV on 9/30 at 10:30 and left message for patient   PAULA/MONAE/Negar

## 2022-11-23 NOTE — ED PROVIDER NOTE - NSFOLLOWUPINSTRUCTIONS_ED_ALL_ED_FT
Abdominal Pain    Many things can cause abdominal pain. Many times, abdominal pain is not caused by a disease and will improve without treatment. Your health care provider will do a physical exam to determine if there is a dangerous cause of your pain; blood tests and imaging may help determine the cause of your pain. However, in many cases, no cause may be found and you may need further testing as an outpatient. Monitor your abdominal pain for any changes.     Please follow up with your GYN within 1 week. Consider getting an MRI outpatient to further evaluate your abdominal pain.     Please return to the ER immediately for: acute worsening abdominal pain, nausea, vomiting, vaginal bleeding, or fever.    SEEK IMMEDIATE MEDICAL CARE IF YOU HAVE ANY OF THE FOLLOWING SYMPTOMS: worsening abdominal pain, uncontrollable vomiting, profuse diarrhea, inability to have bowel movements or pass gas, black or bloody stools, fever accompanying chest pain or back pain, or fainting. These symptoms may represent a serious problem that is an emergency. Do not wait to see if the symptoms will go away. Get medical help right away. Call 911 and do not drive yourself to the hospital.

## 2022-11-23 NOTE — ED PROVIDER NOTE - PATIENT PORTAL LINK FT
You can access the FollowMyHealth Patient Portal offered by [facility] by registering at the following website: [email or web address]. By joining [employer]’s FollowMyHealth portal, you will also be able to view your health information using other applications (apps) compatible with our system.

## 2022-11-23 NOTE — ED PROVIDER NOTE - NS ED ROS FT
GENERAL: No fever, chills  EYES: no vision changes, no discharge.   ENT: no difficulty swallowing or speaking   CARDIAC: no chest pain/pressure, SOB, lower extremity swelling  PULMONARY: no cough, SOB  GI: +abdominal pain, +nausea  : no dysuria  SKIN: no rashes  NEURO: no headache, lightheadedness, paresthesia  MSK: No joint pain, myalgia, weakness.

## 2022-11-23 NOTE — CONSULT NOTE ADULT - SUBJECTIVE AND OBJECTIVE BOX
TD KAMINSKI  51y  Female [id number]    HPI: 50yo P3 LMP  presenting with peristent RLQ pain which she describes as sharp, and radiates down her right leg and towards her back. Pain is somehwat improved with Motrin. She was recently treated for a suspected PID with 2 weeks of antibiotics.    Name of GYN Physician: Dr. Arceo    OB/GYN HISTORY:    x3 (, , ); eTOP D&C x2  States normal pap smears  Name of GYN Physician: Dr. Ita Arceo    PMH: denies  PSH: R rotator cuff surgery, D&C x2  Meds: Macrobid x5d  Allergies: No Known Allergies  SH: Denies smoking use, drug use, alcohol use.         Vital Signs Last 24 Hrs  T(C): 36.7 (2022 06:41), Max: 37.4 (2022 22:29)  T(F): 98 (2022 06:41), Max: 99.3 (2022 22:29)  HR: 85 (2022 06:41) (77 - 85)  BP: 111/71 (2022 06:41) (111/71 - 130/76)  BP(mean): --  RR: 17 (2022 06:41) (16 - 17)  SpO2: 100% (2022 06:41) (100% - 100%)    Parameters below as of 2022 06:41  Patient On (Oxygen Delivery Method): room air      Chaperone: Lisette Chavez MD  Physical Exam:   General: sitting comfortably in bed, NAD   CV: RR S1, S2 no m/r/g  Lungs: CTA b/l, good air flow b/l   Back: No CVA tenderness  Abd: Soft, non-tender, non-distended.  Bowel sounds present.    :  No bleeding on underwear.  External labia wnl.  Bimanual exam with no cervical motion tenderness, uterus wnl, adnexa non palpable b/l.  Cervix closed  Ext: non-tender b/l, no edema     LABS:                            10.4   8.12  )-----------( 342      ( 2022 00:35 )             32.6     11-    137  |  105  |  9   ----------------------------<  92  4.0   |  21<L>  |  0.64    Ca    9.0      2022 00:35    TPro  7.5  /  Alb  4.2  /  TBili  <0.2  /  DBili  x   /  AST  19  /  ALT  14  /  AlkPhos  59      I&O's Detail      Urinalysis Basic - ( 2022 01:16 )    Color: Yellow / Appearance: Clear / S.021 / pH: x  Gluc: x / Ketone: Negative  / Bili: Negative / Urobili: <2 mg/dL   Blood: x / Protein: Negative / Nitrite: Negative   Leuk Esterase: Negative / RBC: 0-2 /HPF / WBC 0-2 /HPF   Sq Epi: x / Non Sq Epi: x / Bacteria: Negative        RADIOLOGY & ADDITIONAL STUDIES:    < from: US Transvaginal (22 @ 05:10) >  ACC: [id number] EXAM:  US TRANSVAGINAL                          PROCEDURE DATE:  2022          INTERPRETATION:  CLINICAL INFORMATION: Lower abdominal pain    LMP: 2022    COMPARISON: 2022.    TECHNIQUE:  Endovaginal and transabdominalpelvic sonogram.    FINDINGS:  Uterus: 11.4 cm x 7.0 cm x 8.5 cm. multiple small intramural fibroids are   appreciated. The largest measures up to 2.6 cm in the posterior uterine   body. This is likely a subserosal component. There is also suggestionof   a partially intracavitary submucosal fibroid of the anterior fundus   measuring 1 cm.  Endometrium: 12mm. Within normal limits.    Right ovary: The right ovary is not well-visualized. In the region of the   right ovary there is a partially visualized cyst which measures at least   4.9 cm. This is seen on previous CT and prior ultrasound of 2022. On   the previous ultrasound this appeared to be separate from the ovary,   though intimately associated.  Left ovary: 2.5 cm x 0.9 cm x 2.5 cm. Within normal limits. Normal   arterial and venous waveforms.    Fluid: None.    IMPRESSION:  Anechoic cystic structure seen within the right posterior cul-de-sac.   Differential considerations include an exophytic right ovarian or   paraovarian cyst, atypical appearance of hydrosalpinx or other cystic   process such as a peritoneal inclusion cyst. As previously recommended,   MRI would better delineate this structure.    Fibroid uterus.    No sonographic evidence of acute left ovarian torsion. The right ovary   could not be distinctly visualized.        --- End of Report ---            HOA AGUILAR MD; Attending Radiologist  This document has been electronically signed. 2022  5:26AM    < end of copied text >   TD KAMINSKI  51y  Female [id number]    HPI: 50yo P3 LMP  presenting with persistent RLQ pain which she describes as sharp, and radiates down her right leg, across her pelvis, and towards her back. Pain is somewhat improved with Motrin and positional. Pain is worse when she sits up and leans forward. She was recently treated for suspected PID on  based on imaging that was suspicious for hydrosalpinx with 2 weeks of antibiotics which she recently finished. She followed up with her GYN Dr. Arceo after her previous ED visit. Patient reports never missing a dose of antibiotics. She denies N/V, CP, SOB, feeling lightheaded or dizzy.    Name of GYN Physician: Dr. Arceo    OB/GYN HISTORY:    x3 (, , ); eTOP D&C x2  States normal pap smears  Name of GYN Physician: Dr. Ita Arceo    PMH: denies  PSH: R rotator cuff surgery, D&C x2  Meds: Macrobid x5d  Allergies: No Known Allergies  SH: Denies smoking use, drug use, alcohol use.         Vital Signs Last 24 Hrs  T(C): 36.7 (2022 06:41), Max: 37.4 (2022 22:29)  T(F): 98 (2022 06:41), Max: 99.3 (2022 22:29)  HR: 85 (2022 06:41) (77 - 85)  BP: 111/71 (2022 06:41) (111/71 - 130/76)  BP(mean): --  RR: 17 (2022 06:41) (16 - 17)  SpO2: 100% (2022 06:41) (100% - 100%)    Parameters below as of 2022 06:41  Patient On (Oxygen Delivery Method): room air      Chaperone: Lisette Chavez MD  Physical Exam:   General: sitting comfortably in bed, NAD   CV: RR S1, S2 no m/r/g  Lungs: CTA b/l, good air flow b/l   Back: No CVA tenderness  Abd: Soft, non-tender, non-distended.  Bowel sounds present.    :  No bleeding. Physiologic discharge.  External labia wnl.  Bimanual exam with no cervical motion tenderness, uterus wnl, adnexa non palpable b/l.  Cervix closed  Ext: non-tender b/l, no edema     LABS:                            10.4   8.12  )-----------( 342      ( 2022 00:35 )             32.6         137  |  105  |  9   ----------------------------<  92  4.0   |  21<L>  |  0.64    Ca    9.0      2022 00:35    TPro  7.5  /  Alb  4.2  /  TBili  <0.2  /  DBili  x   /  AST  19  /  ALT  14  /  AlkPhos  59      I&O's Detail      Urinalysis Basic - ( 2022 01:16 )    Color: Yellow / Appearance: Clear / S.021 / pH: x  Gluc: x / Ketone: Negative  / Bili: Negative / Urobili: <2 mg/dL   Blood: x / Protein: Negative / Nitrite: Negative   Leuk Esterase: Negative / RBC: 0-2 /HPF / WBC 0-2 /HPF   Sq Epi: x / Non Sq Epi: x / Bacteria: Negative        RADIOLOGY & ADDITIONAL STUDIES:    < from: US Transvaginal (22 @ 05:10) >  ACC: [id number] EXAM:  US TRANSVAGINAL                          PROCEDURE DATE:  2022          INTERPRETATION:  CLINICAL INFORMATION: Lower abdominal pain    LMP: 2022    COMPARISON: 2022.    TECHNIQUE:  Endovaginal and transabdominalpelvic sonogram.    FINDINGS:  Uterus: 11.4 cm x 7.0 cm x 8.5 cm. multiple small intramural fibroids are   appreciated. The largest measures up to 2.6 cm in the posterior uterine   body. This is likely a subserosal component. There is also suggestionof   a partially intracavitary submucosal fibroid of the anterior fundus   measuring 1 cm.  Endometrium: 12mm. Within normal limits.    Right ovary: The right ovary is not well-visualized. In the region of the   right ovary there is a partially visualized cyst which measures at least   4.9 cm. This is seen on previous CT and prior ultrasound of 2022. On   the previous ultrasound this appeared to be separate from the ovary,   though intimately associated.  Left ovary: 2.5 cm x 0.9 cm x 2.5 cm. Within normal limits. Normal   arterial and venous waveforms.    Fluid: None.    IMPRESSION:  Anechoic cystic structure seen within the right posterior cul-de-sac.   Differential considerations include an exophytic right ovarian or   paraovarian cyst, atypical appearance of hydrosalpinx or other cystic   process such as a peritoneal inclusion cyst. As previously recommended,   MRI would better delineate this structure.    Fibroid uterus.    No sonographic evidence of acute left ovarian torsion. The right ovary   could not be distinctly visualized.        --- End of Report ---            HOA AGUILAR MD; Attending Radiologist  This document has been electronically signed. 2022  5:26AM    < end of copied text >

## 2022-11-24 LAB
CULTURE RESULTS: NO GROWTH — SIGNIFICANT CHANGE UP
SPECIMEN SOURCE: SIGNIFICANT CHANGE UP

## 2022-12-14 ENCOUNTER — APPOINTMENT (OUTPATIENT)
Dept: OBGYN | Facility: CLINIC | Age: 52
End: 2022-12-14

## 2022-12-14 VITALS
TEMPERATURE: 97.6 F | OXYGEN SATURATION: 100 % | HEIGHT: 66 IN | WEIGHT: 147 LBS | HEART RATE: 90 BPM | DIASTOLIC BLOOD PRESSURE: 77 MMHG | SYSTOLIC BLOOD PRESSURE: 110 MMHG | BODY MASS INDEX: 23.63 KG/M2

## 2022-12-14 DIAGNOSIS — R10.2 PELVIC AND PERINEAL PAIN: ICD-10-CM

## 2022-12-14 PROCEDURE — 81002 URINALYSIS NONAUTO W/O SCOPE: CPT

## 2022-12-14 PROCEDURE — 99214 OFFICE O/P EST MOD 30 MIN: CPT

## 2022-12-14 NOTE — PHYSICAL EXAM
[Chaperone Present] : A chaperone was present in the examining room during all aspects of the physical examination [Appropriately responsive] : appropriately responsive [Alert] : alert [No Acute Distress] : no acute distress [No Lymphadenopathy] : no lymphadenopathy [Regular Rate Rhythm] : regular rate rhythm [No Murmurs] : no murmurs [Clear to Auscultation B/L] : clear to auscultation bilaterally [Soft] : soft [Non-tender] : non-tender [Non-distended] : non-distended [No HSM] : No HSM [No Lesions] : no lesions [No Mass] : no mass [Oriented x3] : oriented x3 [Examination Of The Breasts] : a normal appearance [No Masses] : no breast masses were palpable [Discharge] : discharge [Scant] : scant [Clear] : clear [Thin] : thin [Normal] : normal [Normal Position] : in a normal position [Tenderness] : tender [Uterine Adnexae] : normal [Foul Smelling] : not foul smelling

## 2022-12-14 NOTE — HISTORY OF PRESENT ILLNESS
[Normal Amount/Duration] :  normal amount and duration [Frequency: Q ___ days] : menstrual periods occur approximately every [unfilled] days [Currently Active] : currently active [Men] : men [Vaginal] : vaginal [No] : No [FreeTextEntry1] : 11/29/2022

## 2022-12-15 LAB
C TRACH RRNA SPEC QL NAA+PROBE: NOT DETECTED
N GONORRHOEA RRNA SPEC QL NAA+PROBE: NOT DETECTED
SOURCE AMPLIFICATION: NORMAL

## 2022-12-16 DIAGNOSIS — B37.31 ACUTE CANDIDIASIS OF VULVA AND VAGINA: ICD-10-CM

## 2022-12-16 LAB
APPEARANCE: CLEAR
BACTERIA: ABNORMAL
BILIRUBIN URINE: NEGATIVE
BLOOD URINE: NEGATIVE
CANDIDA VAG CYTO: DETECTED
COLOR: NORMAL
G VAGINALIS+PREV SP MTYP VAG QL MICRO: NOT DETECTED
GLUCOSE QUALITATIVE U: NEGATIVE
HYALINE CASTS: 0 /LPF
KETONES URINE: NEGATIVE
LEUKOCYTE ESTERASE URINE: NEGATIVE
MICROSCOPIC-UA: NORMAL
NITRITE URINE: NEGATIVE
PH URINE: 7
PROTEIN URINE: NEGATIVE
RED BLOOD CELLS URINE: 0 /HPF
SPECIFIC GRAVITY URINE: 1.01
SQUAMOUS EPITHELIAL CELLS: 0 /HPF
T VAGINALIS VAG QL WET PREP: NOT DETECTED
UROBILINOGEN URINE: NORMAL
WHITE BLOOD CELLS URINE: 0 /HPF

## 2022-12-17 DIAGNOSIS — N39.0 URINARY TRACT INFECTION, SITE NOT SPECIFIED: ICD-10-CM

## 2022-12-17 LAB — BACTERIA UR CULT: ABNORMAL

## 2022-12-19 ENCOUNTER — EMERGENCY (EMERGENCY)
Facility: HOSPITAL | Age: 52
LOS: 1 days | Discharge: HOME | End: 2022-12-19
Attending: STUDENT IN AN ORGANIZED HEALTH CARE EDUCATION/TRAINING PROGRAM | Admitting: STUDENT IN AN ORGANIZED HEALTH CARE EDUCATION/TRAINING PROGRAM

## 2022-12-19 VITALS
RESPIRATION RATE: 16 BRPM | TEMPERATURE: 98 F | OXYGEN SATURATION: 100 % | HEART RATE: 94 BPM | SYSTOLIC BLOOD PRESSURE: 122 MMHG | DIASTOLIC BLOOD PRESSURE: 72 MMHG

## 2022-12-19 VITALS
HEART RATE: 86 BPM | DIASTOLIC BLOOD PRESSURE: 88 MMHG | RESPIRATION RATE: 18 BRPM | OXYGEN SATURATION: 100 % | SYSTOLIC BLOOD PRESSURE: 119 MMHG | TEMPERATURE: 99 F

## 2022-12-19 LAB
ALBUMIN SERPL ELPH-MCNC: 4.1 G/DL — SIGNIFICANT CHANGE UP (ref 3.3–5)
ALP SERPL-CCNC: 74 U/L — SIGNIFICANT CHANGE UP (ref 40–120)
ALT FLD-CCNC: 22 U/L — SIGNIFICANT CHANGE UP (ref 4–33)
ANION GAP SERPL CALC-SCNC: 9 MMOL/L — SIGNIFICANT CHANGE UP (ref 7–14)
AST SERPL-CCNC: 25 U/L — SIGNIFICANT CHANGE UP (ref 4–32)
BASOPHILS # BLD AUTO: 0.04 K/UL — SIGNIFICANT CHANGE UP (ref 0–0.2)
BASOPHILS NFR BLD AUTO: 0.5 % — SIGNIFICANT CHANGE UP (ref 0–2)
BILIRUB SERPL-MCNC: 0.4 MG/DL — SIGNIFICANT CHANGE UP (ref 0.2–1.2)
BUN SERPL-MCNC: 10 MG/DL — SIGNIFICANT CHANGE UP (ref 7–23)
CALCIUM SERPL-MCNC: 9.6 MG/DL — SIGNIFICANT CHANGE UP (ref 8.4–10.5)
CHLORIDE SERPL-SCNC: 105 MMOL/L — SIGNIFICANT CHANGE UP (ref 98–107)
CO2 SERPL-SCNC: 24 MMOL/L — SIGNIFICANT CHANGE UP (ref 22–31)
CREAT SERPL-MCNC: 0.64 MG/DL — SIGNIFICANT CHANGE UP (ref 0.5–1.3)
EGFR: 108 ML/MIN/1.73M2 — SIGNIFICANT CHANGE UP
EOSINOPHIL # BLD AUTO: 0.12 K/UL — SIGNIFICANT CHANGE UP (ref 0–0.5)
EOSINOPHIL NFR BLD AUTO: 1.6 % — SIGNIFICANT CHANGE UP (ref 0–6)
GLUCOSE SERPL-MCNC: 80 MG/DL — SIGNIFICANT CHANGE UP (ref 70–99)
HCT VFR BLD CALC: 34.1 % — LOW (ref 34.5–45)
HGB BLD-MCNC: 10.7 G/DL — LOW (ref 11.5–15.5)
IANC: 4.55 K/UL — SIGNIFICANT CHANGE UP (ref 1.8–7.4)
IMM GRANULOCYTES NFR BLD AUTO: 0.1 % — SIGNIFICANT CHANGE UP (ref 0–0.9)
LYMPHOCYTES # BLD AUTO: 2.31 K/UL — SIGNIFICANT CHANGE UP (ref 1–3.3)
LYMPHOCYTES # BLD AUTO: 30.1 % — SIGNIFICANT CHANGE UP (ref 13–44)
MCHC RBC-ENTMCNC: 27 PG — SIGNIFICANT CHANGE UP (ref 27–34)
MCHC RBC-ENTMCNC: 31.4 GM/DL — LOW (ref 32–36)
MCV RBC AUTO: 86.1 FL — SIGNIFICANT CHANGE UP (ref 80–100)
MONOCYTES # BLD AUTO: 0.64 K/UL — SIGNIFICANT CHANGE UP (ref 0–0.9)
MONOCYTES NFR BLD AUTO: 8.3 % — SIGNIFICANT CHANGE UP (ref 2–14)
NEUTROPHILS # BLD AUTO: 4.55 K/UL — SIGNIFICANT CHANGE UP (ref 1.8–7.4)
NEUTROPHILS NFR BLD AUTO: 59.4 % — SIGNIFICANT CHANGE UP (ref 43–77)
NRBC # BLD: 0 /100 WBCS — SIGNIFICANT CHANGE UP (ref 0–0)
NRBC # FLD: 0 K/UL — SIGNIFICANT CHANGE UP (ref 0–0)
PLATELET # BLD AUTO: 326 K/UL — SIGNIFICANT CHANGE UP (ref 150–400)
POTASSIUM SERPL-MCNC: 4 MMOL/L — SIGNIFICANT CHANGE UP (ref 3.5–5.3)
POTASSIUM SERPL-SCNC: 4 MMOL/L — SIGNIFICANT CHANGE UP (ref 3.5–5.3)
PROT SERPL-MCNC: 7.6 G/DL — SIGNIFICANT CHANGE UP (ref 6–8.3)
RBC # BLD: 3.96 M/UL — SIGNIFICANT CHANGE UP (ref 3.8–5.2)
RBC # FLD: 14.8 % — HIGH (ref 10.3–14.5)
SODIUM SERPL-SCNC: 138 MMOL/L — SIGNIFICANT CHANGE UP (ref 135–145)
WBC # BLD: 7.67 K/UL — SIGNIFICANT CHANGE UP (ref 3.8–10.5)
WBC # FLD AUTO: 7.67 K/UL — SIGNIFICANT CHANGE UP (ref 3.8–10.5)

## 2022-12-19 PROCEDURE — 72193 CT PELVIS W/DYE: CPT | Mod: 26,MA

## 2022-12-19 PROCEDURE — 99285 EMERGENCY DEPT VISIT HI MDM: CPT

## 2022-12-19 RX ORDER — SODIUM CHLORIDE 9 MG/ML
1000 INJECTION INTRAMUSCULAR; INTRAVENOUS; SUBCUTANEOUS ONCE
Refills: 0 | Status: COMPLETED | OUTPATIENT
Start: 2022-12-19 | End: 2022-12-19

## 2022-12-19 RX ORDER — HYDROCORTISONE 1 %
1 OINTMENT (GRAM) TOPICAL ONCE
Refills: 0 | Status: COMPLETED | OUTPATIENT
Start: 2022-12-19 | End: 2022-12-19

## 2022-12-19 RX ORDER — HYDROCORTISONE 1 %
1 OINTMENT (GRAM) TOPICAL ONCE
Refills: 0 | Status: DISCONTINUED | OUTPATIENT
Start: 2022-12-19 | End: 2022-12-19

## 2022-12-19 RX ADMIN — SODIUM CHLORIDE 1000 MILLILITER(S): 9 INJECTION INTRAMUSCULAR; INTRAVENOUS; SUBCUTANEOUS at 16:32

## 2022-12-19 RX ADMIN — Medication 1 APPLICATION(S): at 16:32

## 2022-12-19 RX ADMIN — SODIUM CHLORIDE 1000 MILLILITER(S): 9 INJECTION INTRAMUSCULAR; INTRAVENOUS; SUBCUTANEOUS at 15:24

## 2022-12-19 NOTE — ED ADULT NURSE NOTE - NSICDXPASTMEDICALHX_GEN_ALL_CORE_FT
Patient had a blood pressure medication adjustment in November and states blood pressure has been high. Patient states today is  170/103. Patient is also having headaches. Please call to discuss.   
90
PAST MEDICAL HISTORY:  Iron deficiency anemia

## 2022-12-19 NOTE — ED PROVIDER NOTE - ATTENDING APP SHARED VISIT CONTRIBUTION OF CARE
51yo F pw 2 weeks of rectal pain. pt was seen in ED recently and treated for PID, hydrosalpinx, was on 2 weeks of abx, for last 2 weeks has been having rectal pain and felt an area that she felt could be drained. pt family member stuck a needle and had bloody drainage from area. pain worsening, nof irene, + pain with defecation  on exam tender hemorrhoid noted, however tender rectal exam, sx likely from hemorrhoid but givven severity of internal pain will cehck labs and CT, consider surg eval for incision of hemorrhoid

## 2022-12-19 NOTE — ED PROVIDER NOTE - PROGRESS NOTE DETAILS
DANITA Porras- Pt feeling better after ER stay. CT neg for acute pathology. Will give colo rectal surgery list to patient. stable for dc.

## 2022-12-19 NOTE — ED PROVIDER NOTE - PATIENT PORTAL LINK FT
You can access the FollowMyHealth Patient Portal offered by [facility] by registering at the following website: [email or web address]. By joining [employer]’s FollowMyHealth portal, you will also be able to view your health information using other applications (apps) compatible with our system.

## 2022-12-19 NOTE — ED PROVIDER NOTE - OBJECTIVE STATEMENT
52yoF no PMH p/w rectal pain x 2 weeks. atraumatic. admits to mild straining with BM's, and blood streaks when wiping after bathroom use. pt still having BM's however admits to pain with defecation. pts family member had used a needle to poke a supposed bump to her rectal region, which some blood had came out, which occurred 3 days ago. no purulent drainage, fevers, abd pain, n/v/d. no prior hx of rectal abscesses. no prior colonoscopies.

## 2022-12-19 NOTE — ED PROVIDER NOTE - CLINICAL SUMMARY MEDICAL DECISION MAKING FREE TEXT BOX
53yoF p/w atraumatic rectal painx 2 weeks, had palpable nodule and attempted to drain via needle   on exam, indurated circumferential ?hemorrhoid, and pain w/ defecation    will ct pelvis iv contrast to evaluate extent of possible fluid collection

## 2022-12-19 NOTE — ED PROVIDER NOTE - NS ED ATTENDING STATEMENT MOD
This was a shared visit with the STAN. I reviewed and verified the documentation and independently performed the documented:

## 2022-12-19 NOTE — ED ADULT TRIAGE NOTE - CHIEF COMPLAINT QUOTE
Pt AOX4 c/o painful  cyst on rectum, saw OB/GYN sent to ER for eval and I&D. Pt also Dx'd with yeast infx and given abx (she hasn't started yet)

## 2022-12-19 NOTE — ED PROVIDER NOTE - PHYSICAL EXAMINATION
CONSTITUTIONAL: Well-appearing; well-nourished; in no apparent distress;  HEAD: Normocephalic, atraumatic;  EYES: conjunctiva and sclera WNL;  ENT: normal nose;   NECK/LYMPH: Supple;   CARD: Normal S1, S2; no murmurs, rubs, or gallops noted  RESP: Normal chest excursion with respiration; breath sounds clear and equal bilaterally; no wheezes, rhonchi, or rales noted  ABD/GI: soft, non-distended; non-tender; no palpable organomegaly, no pulsatile mass  RECTAL: visible external nonthrombosed hemorrhoids noted. + circumferential .5cmx.5cm area of induration noted at 12 o'clock position. no visible fissures or palpable internal abnormality. exam performed with Dr. Alexandra.  EXT/MS: moves all extremities  SKIN: Normal for age and race; warm; dry; good turgor; no apparent lesions or exudate noted  NEURO: Awake, alert, oriented x 3, no gross deficits  PSYCH: Normal mood; appropriate affect

## 2022-12-19 NOTE — ED ADULT NURSE NOTE - OBJECTIVE STATEMENT
Patient is a 51 yo female, denies PMH, treated for PID 2 weeks ago, presenting with rectal pain x 2 weeks. AAOx4, no signs of distress, denies fever, chills, chest pain, shortness of breath, nausea, vomiting. 20G PIV placed to LAC, labs sent per orders. Hydrocortisone cream pending from pharmacy. Pending CT. Fall precautions maintained.

## 2022-12-27 PROBLEM — D50.9 IRON DEFICIENCY ANEMIA, UNSPECIFIED: Chronic | Status: ACTIVE | Noted: 2022-12-19

## 2023-01-04 ENCOUNTER — NON-APPOINTMENT (OUTPATIENT)
Age: 53
End: 2023-01-04

## 2023-01-04 ENCOUNTER — APPOINTMENT (OUTPATIENT)
Dept: SURGERY | Facility: CLINIC | Age: 53
End: 2023-01-04
Payer: MEDICAID

## 2023-01-04 VITALS
HEIGHT: 66 IN | DIASTOLIC BLOOD PRESSURE: 78 MMHG | RESPIRATION RATE: 17 BRPM | SYSTOLIC BLOOD PRESSURE: 111 MMHG | OXYGEN SATURATION: 100 % | HEART RATE: 78 BPM | TEMPERATURE: 97.5 F | WEIGHT: 146 LBS | BODY MASS INDEX: 23.46 KG/M2

## 2023-01-04 DIAGNOSIS — K64.1 SECOND DEGREE HEMORRHOIDS: ICD-10-CM

## 2023-01-04 DIAGNOSIS — K64.4 RESIDUAL HEMORRHOIDAL SKIN TAGS: ICD-10-CM

## 2023-01-04 DIAGNOSIS — Z12.11 ENCOUNTER FOR SCREENING FOR MALIGNANT NEOPLASM OF COLON: ICD-10-CM

## 2023-01-04 DIAGNOSIS — Z80.7 FAMILY HISTORY OF OTHER MALIGNANT NEOPLASMS OF LYMPHOID, HEMATOPOIETIC AND RELATED TISSUES: ICD-10-CM

## 2023-01-04 PROCEDURE — 46600 DIAGNOSTIC ANOSCOPY SPX: CPT

## 2023-01-04 PROCEDURE — 99204 OFFICE O/P NEW MOD 45 MIN: CPT | Mod: 25

## 2023-01-04 RX ORDER — FLUCONAZOLE 150 MG/1
150 TABLET ORAL
Qty: 2 | Refills: 1 | Status: DISCONTINUED | COMMUNITY
Start: 2022-12-16 | End: 2023-01-04

## 2023-01-04 RX ORDER — CIPROFLOXACIN HYDROCHLORIDE 500 MG/1
500 TABLET, FILM COATED ORAL
Qty: 14 | Refills: 0 | Status: DISCONTINUED | COMMUNITY
Start: 2022-12-17 | End: 2023-01-04

## 2023-01-22 PROBLEM — K64.4 EXTERNAL HEMORRHOID: Status: ACTIVE | Noted: 2023-01-22

## 2023-01-22 PROBLEM — Z12.11 COLON CANCER SCREENING: Status: ACTIVE | Noted: 2023-01-22

## 2023-01-22 PROBLEM — K64.1 SECOND DEGREE HEMORRHOIDS: Status: RESOLVED | Noted: 2023-01-22 | Resolved: 2023-01-22

## 2023-01-24 NOTE — PHYSICAL EXAM
[FreeTextEntry1] : This is a 52 year-old well-developed female in no apparent distress.\par \par HEENT normocephalic, anicteric, external ears normal bilaterally, EOMs intact.\par \par Cardiac - regular rate and rhythm.\par \par Neuro-cranial nerves grossly intact. Normal gait.\par \par Psychiatric-oriented to time place and person. Good understanding of conversation.\par \par See below for anorectal exam. \par \par \par

## 2023-01-24 NOTE — ASSESSMENT
[FreeTextEntry1] : She had episode of perianal swelling with bleeding 12/19, now back to baseline no pain or bleeding.\par Small anterior midline external hemorrhoid.\par STONE and anoscopy nl.\par Picture c/w episode of hemorrhoidal flare-up. \par Family h/o colon ca none. \par had CT in the ED. \par Recommended colonoscopy.

## 2023-01-24 NOTE — HISTORY OF PRESENT ILLNESS
[FreeTextEntry1] : Lilia is a 51 y/o female here for a consultation visit, possible hemorrhoids. \par \par Never had a colonoscopy. \par \par Today pt reports no pain. Daily BMs (did have some diarrhea in Nov - Dec), formed, no straining, denies pain for a week (did have severe pain before in December - relief after using hydrocortisone cream and warm baths), no bleeding, no episodes of incontinence, and doesn’t feel swollen tissue since a week (did feel something swollen during December) but does feel a small cut. Denies nausea and vomiting. Denies fever and chills. Good appetite. Not taking any anticoagulants.

## 2023-05-30 NOTE — ED PROVIDER NOTE - PHYSICAL EXAMINATION
Gen: AAOx3, non-toxic  Head: NCAT  HEENT: EOMI, oral mucosa moist, normal conjunctiva  Lung: CTAB, no respiratory distress, no wheezes/rhonchi/rales B/L, speaking in full sentences  CV: RRR, no murmurs, rubs or gallops  Abd: soft, NTND, no guarding, no CVA tenderness  MSK: TTP of R AC joint, R elbow, and dorsum of hand. no snuff box tenderness. no hip TTP  Neuro: No focal sensory or motor deficits, normal CN exam   Skin: Warm, well perfused, no rash  Psych: normal affect.     ~Rambo Spann PGY1
3 = A little assistance

## 2023-06-09 DIAGNOSIS — Z12.11 ENCOUNTER FOR SCREENING FOR MALIGNANT NEOPLASM OF COLON: ICD-10-CM

## 2023-06-26 ENCOUNTER — APPOINTMENT (OUTPATIENT)
Dept: SURGERY | Facility: HOSPITAL | Age: 53
End: 2023-06-26
Payer: MEDICAID

## 2023-06-26 ENCOUNTER — OUTPATIENT (OUTPATIENT)
Dept: OUTPATIENT SERVICES | Facility: HOSPITAL | Age: 53
LOS: 1 days | End: 2023-06-26
Payer: MEDICAID

## 2023-06-26 ENCOUNTER — TRANSCRIPTION ENCOUNTER (OUTPATIENT)
Age: 53
End: 2023-06-26

## 2023-06-26 VITALS
HEIGHT: 66 IN | OXYGEN SATURATION: 100 % | TEMPERATURE: 97 F | RESPIRATION RATE: 16 BRPM | DIASTOLIC BLOOD PRESSURE: 75 MMHG | SYSTOLIC BLOOD PRESSURE: 119 MMHG | WEIGHT: 147.05 LBS | HEART RATE: 80 BPM

## 2023-06-26 VITALS
OXYGEN SATURATION: 100 % | DIASTOLIC BLOOD PRESSURE: 80 MMHG | HEART RATE: 81 BPM | SYSTOLIC BLOOD PRESSURE: 123 MMHG | RESPIRATION RATE: 20 BRPM

## 2023-06-26 DIAGNOSIS — Z12.11 ENCOUNTER FOR SCREENING FOR MALIGNANT NEOPLASM OF COLON: ICD-10-CM

## 2023-06-26 DIAGNOSIS — Z98.890 OTHER SPECIFIED POSTPROCEDURAL STATES: Chronic | ICD-10-CM

## 2023-06-26 PROCEDURE — 45378 DIAGNOSTIC COLONOSCOPY: CPT

## 2023-06-26 PROCEDURE — G0121: CPT

## 2023-06-26 RX ORDER — SODIUM CHLORIDE 9 MG/ML
500 INJECTION INTRAMUSCULAR; INTRAVENOUS; SUBCUTANEOUS
Refills: 0 | Status: DISCONTINUED | OUTPATIENT
Start: 2023-06-26 | End: 2023-07-10

## 2023-06-26 NOTE — PRE-ANESTHESIA EVALUATION ADULT - NSANTHOSAYNRD_GEN_A_CORE
No. RAGHU screening performed.  STOP BANG Legend: 0-2 = LOW Risk; 3-4 = INTERMEDIATE Risk; 5-8 = HIGH Risk

## 2023-06-26 NOTE — ASU PREOP CHECKLIST - LOOSE TEETH
I reviewed the H&P, I examined the patient, and there are no changes in the patient's condition.  
no

## 2023-06-26 NOTE — PRE PROCEDURE NOTE - PRE PROCEDURE EVALUATION
Attending Physician:          COLTON Conway                 Procedure: colonoscopy    Indication for Procedure: screening  ________________________________________________________  PAST MEDICAL & SURGICAL HISTORY:  Iron deficiency anemia      Syncope      H/O shoulder surgery        ALLERGIES:  Percocet (Other)    HOME MEDICATIONS:    AICD/PPM: [ ] yes   [x ] no    PERTINENT LAB DATA:                      PHYSICAL EXAMINATION:    Height (cm): 167.6  Weight (kg): 66.7  BMI (kg/m2): 23.7  BSA (m2): 1.75T(C): 36.3  HR: 80  BP: 119/75  RR: 16  SpO2: 100%    Constitutional: NAD  HEENT: PERRLA, EOMI,    Neck:  No JVD  Respiratory: CTAB/L  Cardiovascular: S1 and S2  Gastrointestinal: BS+, soft, NT/ND  Extremities: No peripheral edema  Neurological: A/O x 3, no focal deficits  Psychiatric: Normal mood, normal affect  Skin: No rashes    ASA Class: I [ x]  II [ ]  III [ ]  IV [ ]    COMMENTS:    The patient is a suitable candidate for the planned procedure unless box checked [ ]  No, explain:

## 2023-06-27 ENCOUNTER — TRANSCRIPTION ENCOUNTER (OUTPATIENT)
Age: 53
End: 2023-06-27

## 2024-01-03 PROBLEM — R55 SYNCOPE AND COLLAPSE: Chronic | Status: ACTIVE | Noted: 2023-06-26

## 2024-01-07 ENCOUNTER — NON-APPOINTMENT (OUTPATIENT)
Age: 54
End: 2024-01-07

## 2024-01-29 ENCOUNTER — APPOINTMENT (OUTPATIENT)
Dept: OBGYN | Facility: CLINIC | Age: 54
End: 2024-01-29
Payer: MEDICAID

## 2024-01-29 VITALS
HEIGHT: 66 IN | DIASTOLIC BLOOD PRESSURE: 79 MMHG | HEART RATE: 81 BPM | WEIGHT: 147 LBS | BODY MASS INDEX: 23.63 KG/M2 | OXYGEN SATURATION: 99 % | SYSTOLIC BLOOD PRESSURE: 114 MMHG | TEMPERATURE: 98.2 F

## 2024-01-29 DIAGNOSIS — D25.9 LEIOMYOMA OF UTERUS, UNSPECIFIED: ICD-10-CM

## 2024-01-29 DIAGNOSIS — Z01.419 ENCOUNTER FOR GYNECOLOGICAL EXAMINATION (GENERAL) (ROUTINE) W/OUT ABNORMAL FINDINGS: ICD-10-CM

## 2024-01-29 PROCEDURE — 99396 PREV VISIT EST AGE 40-64: CPT

## 2024-01-29 RX ORDER — GLUC/MSM/COLGN2/HYAL/ANTIARTH3 375-375-20
TABLET ORAL
Refills: 0 | Status: COMPLETED | COMMUNITY
End: 2024-01-29

## 2024-01-29 RX ORDER — CYANOCOBALAMIN, ISOPROPYL ALCOHOL 1000MCG/ML
1000 KIT INJECTION
Refills: 0 | Status: ACTIVE | COMMUNITY

## 2024-01-29 RX ORDER — SODIUM PICOSULFATE, MAGNESIUM OXIDE, AND ANHYDROUS CITRIC ACID 10; 3.5; 12 MG/160ML; G/160ML; G/160ML
10-3.5-12 MG-GM LIQUID ORAL
Qty: 1 | Refills: 0 | Status: COMPLETED | COMMUNITY
Start: 2023-06-09 | End: 2024-01-29

## 2024-01-29 NOTE — HISTORY OF PRESENT ILLNESS
[Normal Amount/Duration] :  normal amount and duration [Regular Cycle Intervals] : periods have been regular [Menarche Age: ____] : age at menarche was [unfilled] [FreeTextEntry1] : 01/20/2024 [Currently Active] : currently active [Men] : men [Vaginal] : vaginal [No] : No

## 2024-01-29 NOTE — PHYSICAL EXAM
[Chaperone Present] : A chaperone was present in the examining room during all aspects of the physical examination [Appropriately responsive] : appropriately responsive [Alert] : alert [No Acute Distress] : no acute distress [No Lymphadenopathy] : no lymphadenopathy [Regular Rate Rhythm] : regular rate rhythm [No Murmurs] : no murmurs [Clear to Auscultation B/L] : clear to auscultation bilaterally [Soft] : soft [Non-tender] : non-tender [Non-distended] : non-distended [No HSM] : No HSM [No Lesions] : no lesions [No Mass] : no mass [Oriented x3] : oriented x3 [Examination Of The Breasts] : a normal appearance [No Masses] : no breast masses were palpable [Discharge] : discharge [Scant] : scant [Foul Smelling] : not foul smelling [White] : white [Thin] : thin [Normal] : normal [Normal Position] : in a normal position [Enlarged ___ wks] : enlarged [unfilled] ~Uweeks [Uterine Adnexae] : normal

## 2024-01-30 LAB
C TRACH RRNA SPEC QL NAA+PROBE: NOT DETECTED
HPV HIGH+LOW RISK DNA PNL CVX: NOT DETECTED
N GONORRHOEA RRNA SPEC QL NAA+PROBE: NOT DETECTED
SOURCE TP AMPLIFICATION: NORMAL

## 2024-02-01 LAB — CYTOLOGY CVX/VAG DOC THIN PREP: NORMAL

## 2025-06-12 ENCOUNTER — NON-APPOINTMENT (OUTPATIENT)
Age: 55
End: 2025-06-12

## 2025-06-12 ENCOUNTER — APPOINTMENT (OUTPATIENT)
Dept: OBGYN | Facility: CLINIC | Age: 55
End: 2025-06-12
Payer: MEDICAID

## 2025-06-12 VITALS
DIASTOLIC BLOOD PRESSURE: 75 MMHG | HEIGHT: 66 IN | OXYGEN SATURATION: 100 % | TEMPERATURE: 94.1 F | BODY MASS INDEX: 22.66 KG/M2 | WEIGHT: 141 LBS | HEART RATE: 73 BPM | SYSTOLIC BLOOD PRESSURE: 107 MMHG

## 2025-06-12 PROCEDURE — 99459 PELVIC EXAMINATION: CPT

## 2025-06-12 PROCEDURE — 99213 OFFICE O/P EST LOW 20 MIN: CPT

## 2025-06-13 LAB
BV BACTERIA RRNA VAG QL NAA+PROBE: NOT DETECTED
C GLABRATA RNA VAG QL NAA+PROBE: NOT DETECTED
C TRACH RRNA SPEC QL NAA+PROBE: NOT DETECTED
CANDIDA RRNA VAG QL PROBE: NOT DETECTED
N GONORRHOEA RRNA SPEC QL NAA+PROBE: NOT DETECTED
T VAGINALIS RRNA SPEC QL NAA+PROBE: NOT DETECTED

## (undated) DEVICE — FOLEY HOLDER STATLOCK 2 WAY ADULT

## (undated) DEVICE — ELCTR GROUNDING PAD ADULT COVIDIEN

## (undated) DEVICE — TUBING IV SET GRAVITY 3Y 100" MACRO

## (undated) DEVICE — POLY TRAP ETRAP

## (undated) DEVICE — SOL INJ NS 0.9% 500ML 2 PORT

## (undated) DEVICE — TUBING SUCTION 20FT

## (undated) DEVICE — SENSOR O2 FINGER ADULT

## (undated) DEVICE — Device

## (undated) DEVICE — TUBING SUCTION CONN 6FT STERILE

## (undated) DEVICE — IRRIGATOR BIO SHIELD

## (undated) DEVICE — SUCTION YANKAUER NO CONTROL VENT

## (undated) DEVICE — CATH IV SAFE BC 22G X 1" (BLUE)

## (undated) DEVICE — CLAMP BX HOT RAD JAW 3

## (undated) DEVICE — CATH IV SAFE BC 20G X 1.16" (PINK)

## (undated) DEVICE — PACK IV START WITH CHG

## (undated) DEVICE — TUBING CAP SET ENDO 24HR USE GI